# Patient Record
Sex: FEMALE | Race: OTHER | Employment: UNEMPLOYED | URBAN - METROPOLITAN AREA
[De-identification: names, ages, dates, MRNs, and addresses within clinical notes are randomized per-mention and may not be internally consistent; named-entity substitution may affect disease eponyms.]

---

## 2021-03-17 ENCOUNTER — HOSPITAL ENCOUNTER (OUTPATIENT)
Dept: LAB | Age: 61
Discharge: HOME | End: 2021-03-17

## 2021-03-17 PROCEDURE — 85025 COMPLETE CBC W/AUTO DIFF WBC: CPT

## 2021-03-17 PROCEDURE — 80053 COMPREHEN METABOLIC PANEL: CPT

## 2021-03-17 PROCEDURE — 84439 ASSAY OF FREE THYROXINE: CPT

## 2021-03-17 PROCEDURE — 84481 FREE ASSAY (FT-3): CPT

## 2021-03-17 PROCEDURE — 84443 ASSAY THYROID STIM HORMONE: CPT

## 2021-03-18 LAB
ALBUMIN SERPL-MCNC: 3.5 G/DL (ref 3.5–5)
ALBUMIN/GLOB SERPL: 0.8 {RATIO} (ref 1.1–2.2)
ALP SERPL-CCNC: 197 U/L (ref 45–117)
ALT SERPL-CCNC: 39 U/L (ref 12–78)
ANION GAP SERPL CALC-SCNC: 4 MMOL/L (ref 5–15)
AST SERPL-CCNC: 21 U/L (ref 15–37)
BASOPHILS # BLD: 0 K/UL (ref 0–0.1)
BASOPHILS NFR BLD: 1 % (ref 0–1)
BILIRUB SERPL-MCNC: 0.4 MG/DL (ref 0.2–1)
BUN SERPL-MCNC: 14 MG/DL (ref 6–20)
BUN/CREAT SERPL: 26 (ref 12–20)
CALCIUM SERPL-MCNC: 9.5 MG/DL (ref 8.5–10.1)
CHLORIDE SERPL-SCNC: 107 MMOL/L (ref 97–108)
CO2 SERPL-SCNC: 28 MMOL/L (ref 21–32)
CREAT SERPL-MCNC: 0.53 MG/DL (ref 0.55–1.02)
DIFFERENTIAL METHOD BLD: NORMAL
EOSINOPHIL # BLD: 0.1 K/UL (ref 0–0.4)
EOSINOPHIL NFR BLD: 2 % (ref 0–7)
ERYTHROCYTE [DISTWIDTH] IN BLOOD BY AUTOMATED COUNT: 14.1 % (ref 11.5–14.5)
GLOBULIN SER CALC-MCNC: 4.3 G/DL (ref 2–4)
GLUCOSE SERPL-MCNC: 98 MG/DL (ref 65–100)
HCT VFR BLD AUTO: 38.6 % (ref 35–47)
HGB BLD-MCNC: 12.7 G/DL (ref 11.5–16)
IMM GRANULOCYTES # BLD AUTO: 0 K/UL (ref 0–0.04)
IMM GRANULOCYTES NFR BLD AUTO: 0 % (ref 0–0.5)
LYMPHOCYTES # BLD: 2.8 K/UL (ref 0.8–3.5)
LYMPHOCYTES NFR BLD: 35 % (ref 12–49)
MCH RBC QN AUTO: 27.3 PG (ref 26–34)
MCHC RBC AUTO-ENTMCNC: 32.9 G/DL (ref 30–36.5)
MCV RBC AUTO: 82.8 FL (ref 80–99)
MONOCYTES # BLD: 0.7 K/UL (ref 0–1)
MONOCYTES NFR BLD: 8 % (ref 5–13)
NEUTS SEG # BLD: 4.4 K/UL (ref 1.8–8)
NEUTS SEG NFR BLD: 54 % (ref 32–75)
NRBC # BLD: 0 K/UL (ref 0–0.01)
NRBC BLD-RTO: 0 PER 100 WBC
PLATELET # BLD AUTO: 221 K/UL (ref 150–400)
PMV BLD AUTO: 11.2 FL (ref 8.9–12.9)
POTASSIUM SERPL-SCNC: 4.5 MMOL/L (ref 3.5–5.1)
PROT SERPL-MCNC: 7.8 G/DL (ref 6.4–8.2)
RBC # BLD AUTO: 4.66 M/UL (ref 3.8–5.2)
SODIUM SERPL-SCNC: 139 MMOL/L (ref 136–145)
T3FREE SERPL-MCNC: 5.2 PG/ML (ref 2.2–4)
T4 FREE SERPL-MCNC: 1.9 NG/DL (ref 0.8–1.5)
TSH SERPL DL<=0.05 MIU/L-ACNC: <0.01 UIU/ML (ref 0.36–3.74)
WBC # BLD AUTO: 8.1 K/UL (ref 3.6–11)

## 2021-04-09 ENCOUNTER — VIRTUAL VISIT (OUTPATIENT)
Dept: ENDOCRINOLOGY | Age: 61
End: 2021-04-09
Payer: SUBSIDIZED

## 2021-04-09 DIAGNOSIS — E05.90 HYPERTHYROIDISM: Primary | ICD-10-CM

## 2021-04-09 PROBLEM — R92.8 OTHER ABNORMAL AND INCONCLUSIVE FINDINGS ON DIAGNOSTIC IMAGING OF BREAST: Status: ACTIVE | Noted: 2021-04-09

## 2021-04-09 PROBLEM — E78.6 LIPOPROTEIN DEFICIENCY DISORDER: Status: ACTIVE | Noted: 2021-04-09

## 2021-04-09 PROBLEM — Z40.00 PROPHYLACTIC ORGAN REMOVAL: Status: ACTIVE | Noted: 2021-04-09

## 2021-04-09 PROBLEM — D24.1 FIBROADENOMA OF RIGHT BREAST: Status: ACTIVE | Noted: 2021-04-09

## 2021-04-09 PROBLEM — E66.9 OBESITY: Status: ACTIVE | Noted: 2021-04-09

## 2021-04-09 PROBLEM — E04.1 NONTOXIC SINGLE THYROID NODULE: Status: ACTIVE | Noted: 2021-04-09

## 2021-04-09 PROBLEM — G56.03 BILATERAL CARPAL TUNNEL SYNDROME: Status: ACTIVE | Noted: 2021-04-09

## 2021-04-09 PROBLEM — R68.83 CHILL: Status: ACTIVE | Noted: 2021-04-09

## 2021-04-09 PROBLEM — M17.9 OSTEOARTHRITIS OF KNEE: Status: ACTIVE | Noted: 2021-04-09

## 2021-04-09 PROCEDURE — 99204 OFFICE O/P NEW MOD 45 MIN: CPT | Performed by: INTERNAL MEDICINE

## 2021-04-09 RX ORDER — ATENOLOL 25 MG/1
25 TABLET ORAL DAILY
Qty: 90 TAB | Refills: 1 | Status: SHIPPED | OUTPATIENT
Start: 2021-04-09 | End: 2021-06-11 | Stop reason: SDUPTHER

## 2021-04-09 NOTE — PROGRESS NOTES
CC hyperthyroidism  [phone]    History of Present Illness: Nancy Huffman is a 62 y.o. female seen in referral from Dyllan Pang MD for hypERthyroidism with a thyroid nodule. Patience Roper was admitted with Chest Pain to [facility] in March, found to be hyperthyroid at that time. Cardiac workup was normal, she was begun on methimazole and atenolol. Did not order refill of methimazole- last dose was 1 week ago, same with atenolol. CTA chest was on 02/17/2021. Was constipated, daughter in law told her to start taking potassium. History reviewed. No pertinent past medical history. History reviewed. No pertinent surgical history. No current outpatient medications on file. No current facility-administered medications for this visit.       Not on File  Family History   Problem Relation Age of Onset    Thyroid Disease Niece        Medication supplements: potassium- kadsorb 6 tablets daily (99mg)  magimac 1 spoonful- magnesium citrate     Social Hx: lives in [place]     Review of Systems:  - Constitutional Symptoms: no fevers, chills, weight loss  - Eyes: no blurry vision or double vision  - Cardiovascular: chest pain as per HPI  - Respiratory: no cough or shortness of breath  - Gastrointestinal: constipation as per HPI  - Musculoskeletal: no joint pains or weakness  - Integumentary: no rashes  - Neurological: no numbness, tingling, or headaches  - Psychiatric: no depression or anxiety  - Endocrine: no heat or cold intolerance, no polyuria or polydipsia    - Physical Examination:  - - GENERAL: NCAT, Appears well nourished   - EYES: EOMI, non-icteric, no proptosis   - Ear/Nose/Throat: NCAT, no visible inflammation or masses   - CARDIOVASCULAR: no cyanosis, no visible JVD   - RESPIRATORY: respiratory effort normal without any distress or labored breathing   - MUSCULOSKELETAL: Normal ROM of neck and upper extremities observed   - SKIN: No rash on face  - NEUROLOGIC:  No facial asymmetry (Cranial nerve 7 motor function), No gaze palsy   - PSYCHIATRIC: Normal affect, Normal insight and judgement     Data Reviewed:             Assessment/Plan: This is a very pleasant 63 yo female with a past medical hx significant for carpal tunnel syndrome, obesity, Hyperthyroidism with L sided nodule, suspect toxic nodule. Discussed the following pre-imaging requirements that the  read verbatim to the pt:    #likely toxic L sided nodule  -pt has held methimazole for 7 days at this point  -follow low iodine diet for 7-10 days   -last CT was in February 2021, will have been 8 weeks by 04/17/2021  - NM THYROID IMAGE UPT SNGL/MULTI; Future  - TSH 3RD GENERATION; Future  - T4, FREE; Future  - T3 TOTAL; Future  - THYROID STIMULATING IMMUNOGLOBULIN; Future  -atenolol 25mg refill provided     Avoid these medications for 7 days prior to your appointment:   Multivitamins (iodine-containing)  · Glucosamine/Chondroitin supplements  · Medications containing red food dyes (consult your prescriber before discontinuing any red-colored medications)   Triiodothyronine   Antithyroid medications  o Methimazole, Carbimazole, Tapazole, Mercazole, Propylthiouracil (PTU)  · Amiodarone (Cordarone, Pacerone)    Avoid these medications for 14 days prior to your appointment:  · Potassium Iodide  · Super-Saturated Potassium Iodide (SSKI)  · Lugol's Solution  · Patients should not have received intravenous iodinated contrast material (CT scan, e.g.) for a minimum of 4 weeks prior to this study and preferably 8 weeks prior    Avoid these medications for 4 to 8 weeks prior to your appointment:   Intravenous (IV) iodinated contrast material (CT scan, e.g.)    Avoid these medications as directed:*  · Thyroid extract  · Synthroid, Levothyroxine, [place] Thyroid    *Generally these medications are held for 1-2 weeks prior to the study. Check with your physician for specific instructions.     Avoid this foods for 1 week prior to your exam:  · Iodized salt and sea salt  · All seafood (including fish, shellfish, kelp/seaweed, sushi, crabs)  · Foods that contain the additives carrageen, agar-agar, algin, and alginates  · Breads made with iodate dough conditioners  · Molasses  · Soy products (soy sauce, soy milk)  · Slim-Fast or other meal replacement drinks/shakes    Minimize these foods for 1 week prior to your exam:  · Milk and other dairy products (including ice cream, cheese, yogurt, etc.)  · Eggs  · Cured or corned foods (ham, lox, corned beef, sauerkraut)  · Foods containing red food dyes  · Chocolate    Additional notes/guidelines:  · Avoid restaurant foods since there is no reasonable way to determine which restaurants use iodized salts  · Foods that contain small amounts of milk or eggs may be used  Non-iodized salt may be used as desired. This diet does not limit sodium intake in foods. Copy sent to:Helio Hobbs MD    Return to care on June 11th at 8:30     email sent to pt's son with lab address, phone number to schedule I123 uptake and scan and above instructions    Milan Aguilar is a 62 y.o. female being evaluated by a Virtual Visit (video visit) encounter to address concerns as mentioned above. A caregiver was present when appropriate. Due to this being a TeleHealth encounter (During [facility]-08 public health emergency), evaluation of the following organ systems was limited:     Vitals/Constitutional/EENT/Resp/CV/GI//MS/Neuro/Skin/Heme-Lymph-Imm. Pursuant to the emergency declaration under the Formerly named [facility], [address] [place] [address] authority and the [employer] and [employer] Act, this Virtual Visit was conducted with patient's (and/or legal guardian's) consent, to reduce the risk of exposure to COVID-19 and provide necessary medical care.       Services were provided through a video synchronous discussion virtually to substitute for in-person encounter. --Imtiaz Murrell MD on 4/9/2021 at 8:58 AM    An electronic signature was used to authenticate this note.

## 2021-04-27 ENCOUNTER — HOSPITAL ENCOUNTER (OUTPATIENT)
Dept: LAB | Age: 61
Discharge: HOME | End: 2021-04-27

## 2021-04-27 PROCEDURE — 87624 HPV HI-RISK TYP POOLED RSLT: CPT

## 2021-05-03 LAB
CYTOLOGIST CVX/VAG CYTO: NORMAL
CYTOLOGY CVX/VAG DOC THIN PREP: NORMAL
HPV APTIMA: NEGATIVE
Lab: NORMAL
PATH REPORT.FINAL DX SPEC: NORMAL
STAT OF ADQ CVX/VAG CYTO-IMP: NORMAL

## 2021-05-09 DIAGNOSIS — E05.00 GRAVES DISEASE: Primary | ICD-10-CM

## 2021-05-09 RX ORDER — METHIMAZOLE 10 MG/1
20 TABLET ORAL DAILY
Qty: 60 TAB | Refills: 1 | Status: SHIPPED | OUTPATIENT
Start: 2021-05-09 | End: 2021-06-20 | Stop reason: SDUPTHER

## 2021-05-11 ENCOUNTER — TELEPHONE (OUTPATIENT)
Dept: ENDOCRINOLOGY | Age: 61
End: 2021-05-11

## 2021-05-11 ENCOUNTER — HOSPITAL ENCOUNTER (OUTPATIENT)
Dept: NUCLEAR MEDICINE | Age: 61
Discharge: HOME | End: 2021-05-11
Attending: INTERNAL MEDICINE
Payer: SUBSIDIZED

## 2021-05-11 DIAGNOSIS — E05.90 HYPERTHYROIDISM: ICD-10-CM

## 2021-05-11 PROCEDURE — 78014 THYROID IMAGING W/BLOOD FLOW: CPT

## 2021-05-11 NOTE — TELEPHONE ENCOUNTER
----- Message from Earna Cough sent at 5/11/2021 12:48 PM EDT -----  Regarding: Dr. Lizet Laura General Message/Vendor Calls    Caller's first and last name: [facility] (Nuclear Medicine)      Reason for call: Regarding request per [facility] pt's ultrasound results.        Call back required yes/no and why: Yes       Best contact number(s): [phone] (fax [phone])      Details to clarify the request:      Earna Cough

## 2021-05-11 NOTE — TELEPHONE ENCOUNTER
Called and spoke with Nick Briones at [facility] nuclear meds and was made aware that they were able to pull the pt's order for the thyroid imaging scan, therefore, the request can be disregarded.

## 2021-05-12 ENCOUNTER — TELEPHONE (OUTPATIENT)
Dept: ENDOCRINOLOGY | Age: 61
End: 2021-05-12

## 2021-05-12 ENCOUNTER — HOSPITAL ENCOUNTER (OUTPATIENT)
Dept: NUCLEAR MEDICINE | Age: 61
Discharge: HOME | End: 2021-05-12
Attending: INTERNAL MEDICINE
Payer: SUBSIDIZED

## 2021-05-12 NOTE — TELEPHONE ENCOUNTER
Receive this email through my [employer] messages:    Good afternoon Ms. Amaya. This is in response to our request for any possible imaging reports for Ms. Heidi Brown. Pt. order states that reason for Exam is L sided thyroid nodule. I am wondering if any US or CT was done on a Pt. and reports are available in Dr. [facility] office. There are no studies or reports on [facility] system. If any reports exist please fax them to [address] [phone]. Thank you. Geovanna Brewer was then forward to [address] [place] [address] electronically through [address] [place] [address].

## 2021-05-12 NOTE — TELEPHONE ENCOUNTER
----- Message from [facility] sent at 5/12/2021  8:45 AM EDT -----  Regarding: Dr Lola Saunders Message/Vendor Calls    Caller's first and last name: Jenna Lanza with [facility]      Reason for call: Jenna Lanza is following up on the request for pt      Callback required yes/no and why: yes, to follow up on the request for pt      Best contact number(s): [phone]      Details to clarify the request: Jenna Myla is following up on the request for pts thyroid ultrasound report and to have it fax to [phone]      [facility]

## 2021-05-16 ENCOUNTER — DOCUMENTATION ONLY (OUTPATIENT)
Dept: ENDOCRINOLOGY | Age: 61
End: 2021-05-16

## 2021-05-17 NOTE — PROGRESS NOTES
Called pt  X 2 via a  3-way call with a , but we were unable to reach the pt nor were we able to leave any message. The  called her mobil as well as her home number. On one of the phone lines the voicemail was full and on the other line the voicemail is not yet setup.

## 2021-05-17 NOTE — PROGRESS NOTES
Please call this pt using  and let her know the labs and I131 uptake and scan confirm Grave's disease, an autoimmune cause of hyperthyroidism. I will prescribe 20mg of methimazole to her- she needs to take this daily. We will discuss the risks and benefits of the 3 treatment options (methimazole lifelong/ total thyroidectomy/ radioactive iodine ablation of the thyroid) at her upcoming appointment June 11th. She should repeat labs at [address] [place] [address] 2 weeks after she resumes the methimazole; I have ordered the labs and methimazole.     Pablo Mendez [facility]

## 2021-05-25 ENCOUNTER — DOCUMENTATION ONLY (OUTPATIENT)
Dept: ENDOCRINOLOGY | Age: 61
End: 2021-05-25

## 2021-05-25 DIAGNOSIS — E05.00 GRAVES DISEASE: Primary | ICD-10-CM

## 2021-05-25 NOTE — PROGRESS NOTES
Spoke with pt's son. Sounds like she is leaning towards surgery. He asked me to send email with info because he was driving. You can see Dr. Emmie Chong for evaluation for parathyroid/thyroid surgery. Her phone number is [phone].   [facility]     Justin Conn, [facility]

## 2021-06-08 DIAGNOSIS — E05.00 GRAVES DISEASE: ICD-10-CM

## 2021-06-11 ENCOUNTER — VIRTUAL VISIT (OUTPATIENT)
Dept: ENDOCRINOLOGY | Age: 61
End: 2021-06-11
Payer: SUBSIDIZED

## 2021-06-11 DIAGNOSIS — E05.90 HYPERTHYROIDISM: Primary | ICD-10-CM

## 2021-06-11 PROCEDURE — 99214 OFFICE O/P EST MOD 30 MIN: CPT | Performed by: INTERNAL MEDICINE

## 2021-06-11 RX ORDER — ATENOLOL 25 MG/1
25 TABLET ORAL DAILY
Qty: 90 TABLET | Refills: 1 | Status: SHIPPED | OUTPATIENT
Start: 2021-06-11

## 2021-06-11 NOTE — PROGRESS NOTES
Chief Complaint   Patient presents with    Thyroid Problem     bekah: [email or web address]    Follow-up    Other         History of Present Illness: Nathan Pearson is a 62 y.o. female seen in follow up for continued discussion regarding newly diagnosed Grave's disease. 04/09/2021:  Moncho Alexander was admitted with Chest Pain to [facility] in March, found to be hyperthyroid at that time. Cardiac workup was normal, she was begun on methimazole and atenolol. Did not order refill of methimazole- last dose was 1 week ago, same with atenolol. CTA chest was on 02/17/2021. Was constipated, daughter in law told her to start taking potassium. 06/10/2021: Going on vacation to [place] with her family today. Has been taking methimazole 10mg BID- will have labs drawn at [facility] on Monday. Does want to move forward with surgery, hasn't made appt yet. Continues to take atenolol as well. History reviewed. No pertinent surgical history. Current Outpatient Medications   Medication Sig    atenoloL (TENORMIN) 25 mg tablet Take 1 Tablet by mouth daily.  methIMAzole (TAPAZOLE) 10 mg tablet Take 2 Tabs by mouth daily. No current facility-administered medications for this visit.      No Known Allergies  Family History   Problem Relation Age of Onset    Thyroid Disease Niece     No Known Problems Mother     Cancer Father     No Known Problems Sister     No Known Problems Brother        Medication supplements: potassium- kadsorb 6 tablets daily (99mg)  magimac 1 spoonful- magnesium citrate     Social Hx: lives in [facility]:  - Constitutional Symptoms: no fevers, chills, weight loss  - Eyes: no blurry vision or double vision  - Cardiovascular: chest pain as per HPI  - Respiratory: no cough or shortness of breath  - Gastrointestinal: constipation as per HPI  - Musculoskeletal: no joint pains or weakness  - Integumentary: no rashes  - Neurological: no numbness, tingling, or headaches  - Psychiatric: no depression or anxiety  - Endocrine: no heat or cold intolerance, no polyuria or polydipsia    - Physical Examination:  - - GENERAL: NCAT, Appears well nourished   - EYES: EOMI, non-icteric, no proptosis   - Ear/Nose/Throat: NCAT, no visible inflammation or masses   - CARDIOVASCULAR: no cyanosis, no visible JVD   - RESPIRATORY: respiratory effort normal without any distress or labored breathing   - MUSCULOSKELETAL: Normal ROM of neck and upper extremities observed   - SKIN: No rash on face  - NEUROLOGIC:  No facial asymmetry (Cranial nerve 7 motor function), No gaze palsy   - PSYCHIATRIC: Normal affect, Normal insight and judgement     Data Reviewed:                 Assessment/Plan: This is a very pleasant 61 yo female with a past medical hx significant for carpal tunnel syndrome, obesity seen in f/u for continued discussion regarding Grave's disease. We have begun methimazole 10mg BID, will down-titrate according to repeat labs that are going to be done on Monday, June 14. I discussed both total thyroidectomy and I-131 with the patient's son and the patient and she does wish to proceed with total thyroidectomy once she is biochemically euthyroid. Have provided her with the contact information for the thyroid surgeon and will move forward with pursuing thyroidectomy once labs normalized. Refill provided for atenolol in the preoperative setting. Anticipate discontinuing this postoperatively. #Grave's disease  -has been taking methimazole 10mg BID, reassess labs on Monday June 14th  -once euthyroid, move forward with surgery   -last CT was in February 2021, will have been 8 weeks by 04/17/2021  -atenolol 25mg refill provided     Copy sent to:Helio Boss MD    Return to care Sept 10th 8:30    You can see Dr. Elias Villar for evaluation for parathyroid/thyroid surgery. Her phone number is [phone].   [facility])     Please call [facility] to set up a consultation with Dr. Mayte Longoria at [phone]. They are located in [address] [place] [address] on the 2nd floor, [address]. [facility])       Jared Flores is a 62 y.o. female being evaluated by a Virtual Visit (video visit) encounter to address concerns as mentioned above. A caregiver was present when appropriate. Due to this being a TeleHealth encounter (During [facility] public health emergency), evaluation of the following organ systems was limited:     Vitals/Constitutional/EENT/Resp/CV/GI//MS/Neuro/Skin/Heme-Lymph-Imm. Pursuant to the emergency declaration under the [address] [place] [address] authority and the [employer] and [employer] Act, this Virtual Visit was conducted with patient's (and/or legal guardian's) consent, to reduce the risk of exposure to COVID-19 and provide necessary medical care. Services were provided through a video synchronous discussion virtually to substitute for in-person encounter. --Ambreen Ortez MD on 6/11/2021 at 8:58 AM    An electronic signature was used to authenticate this note.

## 2021-06-11 NOTE — PROGRESS NOTES
Lab Results   Component Value Date/Time    TSH <0.01 (L) 04/26/2021 12:14 PM    T4, Free 2.4 (H) 04/26/2021 12:14 PM

## 2021-06-11 NOTE — Clinical Note
Lobito Ceron- do you mind putting this pt down for a fu visit on Sept 10th at 8:30? Julio Almendarez our PSR for the day doesn't have an in basket to which I can send messages.  TY

## 2021-06-19 LAB
ALBUMIN SERPL-MCNC: 3.9 G/DL (ref 3.8–4.9)
ALBUMIN/GLOB SERPL: 1.2 {RATIO} (ref 1.2–2.2)
ALP SERPL-CCNC: 188 IU/L (ref 48–121)
ALT SERPL-CCNC: 39 IU/L (ref 0–32)
AST SERPL-CCNC: 26 IU/L (ref 0–40)
BASOPHILS # BLD AUTO: 0 X10E3/UL (ref 0–0.2)
BASOPHILS NFR BLD AUTO: 0 %
BILIRUB SERPL-MCNC: 0.4 MG/DL (ref 0–1.2)
BUN SERPL-MCNC: 10 MG/DL (ref 6–24)
BUN/CREAT SERPL: 17 (ref 9–23)
CALCIUM SERPL-MCNC: 9.4 MG/DL (ref 8.7–10.2)
CHLORIDE SERPL-SCNC: 103 MMOL/L (ref 96–106)
CO2 SERPL-SCNC: 23 MMOL/L (ref 20–29)
CREAT SERPL-MCNC: 0.6 MG/DL (ref 0.57–1)
EOSINOPHIL # BLD AUTO: 0.1 X10E3/UL (ref 0–0.4)
EOSINOPHIL NFR BLD AUTO: 1 %
ERYTHROCYTE [DISTWIDTH] IN BLOOD BY AUTOMATED COUNT: 13.4 % (ref 11.7–15.4)
GLOBULIN SER CALC-MCNC: 3.3 G/DL (ref 1.5–4.5)
GLUCOSE SERPL-MCNC: 86 MG/DL (ref 65–99)
HCT VFR BLD AUTO: 38.2 % (ref 34–46.6)
HGB BLD-MCNC: 12.3 G/DL (ref 11.1–15.9)
IMM GRANULOCYTES # BLD AUTO: 0 X10E3/UL (ref 0–0.1)
IMM GRANULOCYTES NFR BLD AUTO: 0 %
LYMPHOCYTES # BLD AUTO: 2.6 X10E3/UL (ref 0.7–3.1)
LYMPHOCYTES NFR BLD AUTO: 32 %
MCH RBC QN AUTO: 27.2 PG (ref 26.6–33)
MCHC RBC AUTO-ENTMCNC: 32.2 G/DL (ref 31.5–35.7)
MCV RBC AUTO: 84 FL (ref 79–97)
MONOCYTES # BLD AUTO: 0.7 X10E3/UL (ref 0.1–0.9)
MONOCYTES NFR BLD AUTO: 8 %
NEUTROPHILS # BLD AUTO: 4.7 X10E3/UL (ref 1.4–7)
NEUTROPHILS NFR BLD AUTO: 59 %
PLATELET # BLD AUTO: 229 X10E3/UL (ref 150–450)
POTASSIUM SERPL-SCNC: 4.7 MMOL/L (ref 3.5–5.2)
PROT SERPL-MCNC: 7.2 G/DL (ref 6–8.5)
RBC # BLD AUTO: 4.53 X10E6/UL (ref 3.77–5.28)
SODIUM SERPL-SCNC: 139 MMOL/L (ref 134–144)
T3 SERPL-MCNC: 263 NG/DL (ref 71–180)
T4 FREE SERPL-MCNC: 2.37 NG/DL (ref 0.82–1.77)
WBC # BLD AUTO: 8.1 X10E3/UL (ref 3.4–10.8)

## 2021-06-20 DIAGNOSIS — E05.90 HYPERTHYROIDISM: Primary | ICD-10-CM

## 2021-06-20 RX ORDER — METHIMAZOLE 10 MG/1
20 TABLET ORAL 2 TIMES DAILY
Qty: 60 TABLET | Refills: 1 | Status: SHIPPED | OUTPATIENT
Start: 2021-06-20 | End: 2021-06-20 | Stop reason: SDUPTHER

## 2021-06-20 RX ORDER — METHIMAZOLE 10 MG/1
20 TABLET ORAL 2 TIMES DAILY
Qty: 60 TABLET | Refills: 1 | Status: SHIPPED | OUTPATIENT
Start: 2021-06-20

## 2021-06-20 NOTE — LETTER
6/20/2021 Ms. Paola Garcia [facility] [id number] Dear Paola Damon: 
 
Please find your most recent results below. Resulted Orders CBC WITH AUTOMATED DIFF Result Value Ref Range WBC 8.1 3.4 - 10.8 x10E3/uL  
 RBC 4.53 3.77 - 5.28 x10E6/uL HGB 12.3 11.1 - 15.9 g/dL HCT 38.2 34.0 - 46.6 % MCV 84 79 - 97 fL  
 MCH 27.2 26.6 - 33.0 pg  
 MCHC 32.2 31.5 - 35.7 g/dL  
 RDW 13.4 11.7 - 15.4 % PLATELET 208 306 - 001 x10E3/uL NEUTROPHILS 59 Not Estab. % Lymphocytes 32 Not Estab. % MONOCYTES 8 Not Estab. % EOSINOPHILS 1 Not Estab. % BASOPHILS 0 Not Estab. %  
 ABS. NEUTROPHILS 4.7 1.4 - 7.0 x10E3/uL Abs Lymphocytes 2.6 0.7 - 3.1 x10E3/uL  
 ABS. MONOCYTES 0.7 0.1 - 0.9 x10E3/uL  
 ABS. EOSINOPHILS 0.1 0.0 - 0.4 x10E3/uL  
 ABS. BASOPHILS 0.0 0.0 - 0.2 x10E3/uL IMMATURE GRANULOCYTES 0 Not Estab. %  
 ABS. IMM. GRANS. 0.0 0.0 - 0.1 x10E3/uL Narrative Performed at:  [address]  [id number] : Rosa Oneal MD, Phone:  [phone] METABOLIC PANEL, COMPREHENSIVE Result Value Ref Range Glucose 86 65 - 99 mg/dL BUN 10 6 - 24 mg/dL Creatinine 0.60 0.57 - 1.00 mg/dL GFR est non- >59 mL/min/1.73 GFR est  >59 mL/min/1.73  
 BUN/Creatinine ratio 17 9 - 23 Sodium 139 134 - 144 mmol/L Potassium 4.7 3.5 - 5.2 mmol/L Chloride 103 96 - 106 mmol/L  
 CO2 23 20 - 29 mmol/L Calcium 9.4 8.7 - 10.2 mg/dL Protein, total 7.2 6.0 - 8.5 g/dL Albumin 3.9 3.8 - 4.9 g/dL GLOBULIN, TOTAL 3.3 1.5 - 4.5 g/dL A-G Ratio 1.2 1.2 - 2.2 Bilirubin, total 0.4 0.0 - 1.2 mg/dL Alk. phosphatase 188 (H) 48 - 121 IU/L  
 AST (SGOT) 26 0 - 40 IU/L  
 ALT (SGPT) 39 (H) 0 - 32 IU/L Narrative Performed at:  [address]  [id number] : Rosa Oneal MD, Phone:  [phone] T4, FREE Result Value Ref Range T4, Free 2.37 (H) 0.82 - 1.77 ng/dL Narrative Performed at:  [address]  [id number] : Perry Garza MD, Phone:  [phone] T3 TOTAL Result Value Ref Range T3, total 263 (H) 71 - 180 ng/dL Narrative Performed at:  [address]  [id number] : Perry Garza MD, Phone:  [phone] RECOMMENDATIONS: 
Hi Charity, Your thyroid function tests remain elevated with 10mg twice daily; we now need to increase the dose to 20mg twice daily. Please repeat labs in 2 weeks. This is a very important lab draw as your liver function tests are just slightly abnormal and I need to ensure they do not rise further on the next draw with the elevated dose. Please call me if you have any questions: [phone] Sincerely, 
 
 
India Bolanos MD

## 2021-07-07 ENCOUNTER — DOCUMENTATION ONLY (OUTPATIENT)
Dept: ENDOCRINOLOGY | Age: 61
End: 2021-07-07

## 2021-08-13 ENCOUNTER — TELEPHONE (OUTPATIENT)
Dept: ENDOCRINOLOGY | Age: 61
End: 2021-08-13

## 2021-08-13 NOTE — TELEPHONE ENCOUNTER
I sent the letter to this address:     Ms. HAWK [address] [address]    Is that not correct? She can have labs done on 10mg once daily. In the future they need to collect their mail.     Pablo Sheikh [facility]

## 2021-08-13 NOTE — TELEPHONE ENCOUNTER
Spoke with pt's son to see whether or not his mother repeated her lab work and he stated that she has not. He then statef that he was not aware that she needed additional labs. Pt is still taking Methimazole 10 mg and was not aware that she was suppose to increase the medication and then go for repeat labs. Please advise.

## 2021-08-13 NOTE — TELEPHONE ENCOUNTER
----- Message from Lamar Tinoco sent at 8/13/2021 12:27 PM EDT -----  Regarding: Dr. Jewel Rosales (if not patient): Pt      Relationship of caller (if not patient): Pt      Best contact number(s): [phone]      Name of medication and dosage if known: methIMAzole (TAPAZOLE) 10 mg tablet      Is patient out of this medication (yes/no): Yes      Pharmacy name: [facility] listed in chart? (yes/no): Yes  Pharmacy phone number: Phone:  [phone]  Fax:  [phone]       Details to clarify the request:      Lamar Tinoco

## 2022-03-18 PROBLEM — E66.9 OBESITY: Status: ACTIVE | Noted: 2021-04-09

## 2022-03-18 PROBLEM — E04.1 NONTOXIC SINGLE THYROID NODULE: Status: ACTIVE | Noted: 2021-04-09

## 2022-03-18 PROBLEM — G56.03 BILATERAL CARPAL TUNNEL SYNDROME: Status: ACTIVE | Noted: 2021-04-09

## 2022-03-18 PROBLEM — R92.8 OTHER ABNORMAL AND INCONCLUSIVE FINDINGS ON DIAGNOSTIC IMAGING OF BREAST: Status: ACTIVE | Noted: 2021-04-09

## 2022-03-18 PROBLEM — M17.9 OSTEOARTHRITIS OF KNEE: Status: ACTIVE | Noted: 2021-04-09

## 2022-03-19 PROBLEM — R68.83 CHILL: Status: ACTIVE | Noted: 2021-04-09

## 2022-03-19 PROBLEM — Z40.00 PROPHYLACTIC ORGAN REMOVAL: Status: ACTIVE | Noted: 2021-04-09

## 2022-03-19 PROBLEM — E05.90 HYPERTHYROIDISM: Status: ACTIVE | Noted: 2021-04-09

## 2022-03-20 PROBLEM — E78.6 LIPOPROTEIN DEFICIENCY DISORDER: Status: ACTIVE | Noted: 2021-04-09

## 2022-03-20 PROBLEM — D24.1 FIBROADENOMA OF RIGHT BREAST: Status: ACTIVE | Noted: 2021-04-09

## 2022-11-21 ENCOUNTER — HOSPITAL ENCOUNTER (OUTPATIENT)
Dept: LAB | Age: 62
Discharge: HOME | End: 2022-11-21

## 2022-11-21 PROCEDURE — 81001 URINALYSIS AUTO W/SCOPE: CPT

## 2022-11-22 LAB
APPEARANCE UR: CLEAR
BACTERIA URNS QL MICRO: NEGATIVE /HPF
BILIRUB UR QL: NEGATIVE
COLOR UR: ABNORMAL
EPITH CASTS URNS QL MICRO: ABNORMAL /LPF
GLUCOSE UR STRIP.AUTO-MCNC: NEGATIVE MG/DL
HGB UR QL STRIP: ABNORMAL
KETONES UR QL STRIP.AUTO: NEGATIVE MG/DL
LEUKOCYTE ESTERASE UR QL STRIP.AUTO: NEGATIVE
NITRITE UR QL STRIP.AUTO: NEGATIVE
PH UR STRIP: 5.5 [PH] (ref 5–8)
PROT UR STRIP-MCNC: NEGATIVE MG/DL
RBC #/AREA URNS HPF: ABNORMAL /HPF (ref 0–5)
SP GR UR REFRACTOMETRY: 1.02 (ref 1–1.03)
UROBILINOGEN UR QL STRIP.AUTO: 0.2 EU/DL (ref 0.2–1)
WBC URNS QL MICRO: ABNORMAL /HPF (ref 0–4)

## 2024-07-16 ENCOUNTER — HOSPITAL ENCOUNTER (OUTPATIENT)
Facility: HOSPITAL | Age: 64
Setting detail: SPECIMEN
Discharge: HOME | End: 2024-07-19

## 2024-07-16 PROCEDURE — 36415 COLL VENOUS BLD VENIPUNCTURE: CPT

## 2024-07-19 LAB
ADDITIONAL CLINICAL INFO: NORMAL
BRCA1+BRCA2 MUT ANL BLD/T: NORMAL
INTERPRETATION: NORMAL
Lab: NORMAL
METHODS AND LIMITATIONS: NORMAL
PREAUTHORIZATION: NORMAL
RELEASED BY: NORMAL
SPECIMEN TYPE: NORMAL

## 2024-07-31 LAB
ADDITIONAL CLINICAL INFO: NORMAL
BRCA1+BRCA2 MUT ANL BLD/T: NORMAL
INTERPRETATION: NORMAL
Lab: NORMAL
Lab: NORMAL
METHODS AND LIMITATIONS: NORMAL
PREAUTHORIZATION: NORMAL
RELEASED BY: NORMAL
SPECIMEN TYPE: NORMAL